# Patient Record
Sex: MALE | Race: WHITE | NOT HISPANIC OR LATINO | Employment: UNEMPLOYED | URBAN - NONMETROPOLITAN AREA
[De-identification: names, ages, dates, MRNs, and addresses within clinical notes are randomized per-mention and may not be internally consistent; named-entity substitution may affect disease eponyms.]

---

## 2021-04-19 ENCOUNTER — APPOINTMENT (EMERGENCY)
Dept: RADIOLOGY | Facility: HOSPITAL | Age: 56
End: 2021-04-19
Payer: COMMERCIAL

## 2021-04-19 ENCOUNTER — HOSPITAL ENCOUNTER (EMERGENCY)
Facility: HOSPITAL | Age: 56
Discharge: HOME | End: 2021-04-19
Attending: EMERGENCY MEDICINE | Admitting: EMERGENCY MEDICINE
Payer: COMMERCIAL

## 2021-04-19 VITALS
BODY MASS INDEX: 30.33 KG/M2 | WEIGHT: 188.71 LBS | SYSTOLIC BLOOD PRESSURE: 154 MMHG | DIASTOLIC BLOOD PRESSURE: 73 MMHG | HEART RATE: 71 BPM | TEMPERATURE: 98.6 F | OXYGEN SATURATION: 98 % | RESPIRATION RATE: 17 BRPM | HEIGHT: 66 IN

## 2021-04-19 DIAGNOSIS — G62.9 NEUROPATHY: Primary | ICD-10-CM

## 2021-04-19 PROCEDURE — 96372 THER/PROPH/DIAG INJ SC/IM: CPT

## 2021-04-19 PROCEDURE — 99284 EMERGENCY DEPT VISIT MOD MDM: CPT | Performed by: EMERGENCY MEDICINE

## 2021-04-19 PROCEDURE — 99283 EMERGENCY DEPT VISIT LOW MDM: CPT

## 2021-04-19 PROCEDURE — 73630 X-RAY EXAM OF FOOT: CPT

## 2021-04-19 RX ORDER — KETOROLAC TROMETHAMINE 30 MG/ML
15 INJECTION, SOLUTION INTRAMUSCULAR; INTRAVENOUS ONCE
Status: COMPLETED | OUTPATIENT
Start: 2021-04-19 | End: 2021-04-19

## 2021-04-19 RX ORDER — BUSPIRONE HYDROCHLORIDE 10 MG/1
10 TABLET ORAL AS NEEDED
COMMUNITY

## 2021-04-19 RX ORDER — ACETAMINOPHEN 325 MG/1
325 TABLET ORAL EVERY 6 HOURS PRN
COMMUNITY

## 2021-04-19 RX ORDER — GABAPENTIN 600 MG/1
1200 TABLET ORAL 2 TIMES DAILY
COMMUNITY

## 2021-04-19 RX ORDER — CYCLOBENZAPRINE HCL 10 MG
10 TABLET ORAL 3 TIMES DAILY PRN
COMMUNITY

## 2021-04-19 RX ORDER — ATORVASTATIN CALCIUM 40 MG/1
40 TABLET, FILM COATED ORAL DAILY
COMMUNITY

## 2021-04-19 RX ORDER — CLOPIDOGREL BISULFATE 75 MG/1
75 TABLET ORAL DAILY
COMMUNITY

## 2021-04-19 RX ORDER — ASPIRIN 81 MG/1
81 TABLET ORAL DAILY
COMMUNITY

## 2021-04-19 RX ORDER — PANTOPRAZOLE SODIUM 40 MG/1
40 TABLET, DELAYED RELEASE ORAL DAILY
COMMUNITY

## 2021-04-19 RX ORDER — KETOROLAC TROMETHAMINE 10 MG/1
10 TABLET, FILM COATED ORAL EVERY 6 HOURS PRN
Qty: 30 TABLET | Refills: 0 | Status: SHIPPED | OUTPATIENT
Start: 2021-04-19

## 2021-04-19 RX ADMIN — KETOROLAC TROMETHAMINE 15 MG: 30 INJECTION, SOLUTION INTRAMUSCULAR at 08:49

## 2021-04-19 NOTE — ED PROVIDER NOTES
History  Chief Complaint   Patient presents with    Foot Pain     pt c/o increased pain in righ foot stating foot curled and unsure if stubbed foot  hx drop foot  pt taking current medication regimen and tylenol in addition with some relief  41-year-old male with a history of right lower extremity neuropathy secondary to previous need for fasciotomy  Patient reports he has chronic footdrop in this extremity  Started with increasing amount of pain over the last 24 to 48 hours  Is not controlled by his normal pain medications of gabapentin  Paid is increased with ambulation  Cannot recall any specific injury  Reports that it is a shooting pain radiating up from his lateral malleolus to about the area of the mid calf  Denies any fevers or chills  No nausea vomiting  No chest pain or shortness of breath  History provided by:  Patient and spouse  Leg Pain  Location:  Ankle and leg  Leg location:  R leg  Ankle location:  R ankle  Pain details:     Quality:  Aching and burning    Radiates to:  R leg    Severity:  Severe    Onset quality:  Gradual    Timing:  Constant    Progression:  Unchanged  Chronicity:  Recurrent  Worsened by: Activity  Associated symptoms: no back pain, no decreased ROM, no fatigue, no fever, no itching, no muscle weakness, no neck pain, no numbness, no stiffness, no swelling and no tingling        Prior to Admission Medications   Prescriptions Last Dose Informant Patient Reported?  Taking?   acetaminophen (TYLENOL) 325 mg tablet  Self Yes Yes   Sig: Take 325 mg by mouth every 6 (six) hours as needed for mild pain   aspirin (ECOTRIN LOW STRENGTH) 81 mg EC tablet  Self Yes Yes   Sig: Take 81 mg by mouth daily   atorvastatin (LIPITOR) 40 mg tablet  Self Yes Yes   Sig: Take 40 mg by mouth daily   busPIRone (BUSPAR) 10 mg tablet  Self Yes Yes   Sig: Take 10 mg by mouth as needed   clopidogrel (PLAVIX) 75 mg tablet  Self Yes Yes   Sig: Take 75 mg by mouth daily   cyclobenzaprine (FLEXERIL) 10 mg tablet  Self Yes Yes   Sig: Take 10 mg by mouth 3 (three) times a day as needed for muscle spasms   gabapentin (NEURONTIN) 600 MG tablet  Self Yes Yes   Sig: Take 1,200 mg by mouth 2 (two) times a day   metoprolol tartrate (LOPRESSOR) 25 mg tablet  Self Yes Yes   Sig: Take 25 mg by mouth every 12 (twelve) hours   pantoprazole (PROTONIX) 40 mg tablet  Self Yes Yes   Sig: Take 40 mg by mouth daily      Facility-Administered Medications: None       Past Medical History:   Diagnosis Date    Coronary artery disease     Foot drop, right foot        Past Surgical History:   Procedure Laterality Date    CORONARY ANGIOPLASTY WITH STENT PLACEMENT  2009    ILIAC ARTERY STENT Bilateral 01/24/2017    NECK SURGERY      screws and plates    TONSILLECTOMY         History reviewed  No pertinent family history  I have reviewed and agree with the history as documented  E-Cigarette/Vaping    E-Cigarette Use Never User      E-Cigarette/Vaping Substances     Social History     Tobacco Use    Smoking status: Current Every Day Smoker     Packs/day: 1 00     Types: Cigarettes    Smokeless tobacco: Never Used   Substance Use Topics    Alcohol use: Not Currently    Drug use: Yes     Types: Marijuana       Review of Systems   Constitutional: Negative  Negative for fatigue and fever  HENT: Negative  Respiratory: Negative  Cardiovascular: Negative  Gastrointestinal: Negative  Musculoskeletal: Positive for arthralgias and myalgias  Negative for back pain, neck pain and stiffness  Skin: Negative for itching  All other systems reviewed and are negative  Physical Exam  Physical Exam  Vitals signs and nursing note reviewed  Constitutional:       General: He is in acute distress  Appearance: Normal appearance  He is normal weight  He is not ill-appearing or toxic-appearing  HENT:      Head: Normocephalic and atraumatic        Right Ear: External ear normal       Left Ear: External ear normal       Nose: Nose normal       Mouth/Throat:      Pharynx: Oropharynx is clear  Eyes:      General:         Right eye: No discharge  Left eye: No discharge  Conjunctiva/sclera: Conjunctivae normal    Pulmonary:      Effort: Pulmonary effort is normal    Abdominal:      General: Abdomen is flat  Musculoskeletal:         General: Tenderness present  No swelling or signs of injury  Right ankle: No tenderness  Right lower leg: He exhibits no tenderness  Skin:     General: Skin is warm  Capillary Refill: Capillary refill takes less than 2 seconds  Coloration: Skin is not jaundiced or pale  Findings: No bruising, erythema or lesion  Neurological:      General: No focal deficit present  Mental Status: He is alert  Psychiatric:         Mood and Affect: Mood normal          Thought Content: Thought content normal          Judgment: Judgment normal          Vital Signs  ED Triage Vitals [04/19/21 0821]   Temperature Pulse Respirations Blood Pressure SpO2   98 6 °F (37 °C) 73 17 (!) 192/85 99 %      Temp Source Heart Rate Source Patient Position - Orthostatic VS BP Location FiO2 (%)   Temporal Monitor Lying Right arm --      Pain Score       Worst Possible Pain           Vitals:    04/19/21 0821 04/19/21 0845   BP: (!) 192/85 154/73   Pulse: 73 71   Patient Position - Orthostatic VS: Lying          Visual Acuity      ED Medications  Medications   ketorolac (TORADOL) injection 15 mg (15 mg Intramuscular Given 4/19/21 0849)       Diagnostic Studies  Results Reviewed     None                 XR foot 3+ views RIGHT   Final Result by Manasa Graham MD (04/19 3202)      No acute osseous abnormality  Workstation performed: [id number]                    Procedures  Procedures         ED Course  ED Course as of Apr 19 1022   Mon Apr 19, 2021   6477 X-ray negative  Patient reports feeling better    Patient will follow-up with his routine follow-up with Neurology and Pain   Patient stable for discharge  SBIRT 20yo+      Most Recent Value   SBIRT (22 yo +)   In order to provide better care to our patients, we are screening all of our patients for alcohol and drug use  Would it be okay to ask you these screening questions? Yes Filed at: 04/19/2021 0851   Initial Alcohol Screen: US AUDIT-C    1  How often do you have a drink containing alcohol? 1 Filed at: 04/19/2021 0851   2  How many drinks containing alcohol do you have on a typical day you are drinking? 0 Filed at: 04/19/2021 0851   3a  Male UNDER 65: How often do you have five or more drinks on one occasion? 0 Filed at: 04/19/2021 0851   3b  FEMALE Any Age, or MALE 65+: How often do you have 4 or more drinks on one occassion? 0 Filed at: 04/19/2021 0851   Audit-C Score  1 Filed at: 04/19/2021 3536   STEWART: How many times in the past year have you    Used an illegal drug or used a prescription medication for non-medical reasons? Never Filed at: 04/19/2021 0851                    MDM  Number of Diagnoses or Management Options  Neuropathy: established and worsening  Diagnosis management comments: There is no actual area tenderness  Patient describes it more as a neuropathic type pain which he has had before but for some reason it is worse today  Denies any specific injury but reports that because of his footdrop and the lack of sensation he may be unaware of a injury  X-ray was negative  Patient was improved with Toradol  There was no clinical evidence of the patient having a DVT or evidence significant skin compromise that would be concern for infection  There was no outward evidence of fracture  There were no other symptomatology that was concerning for a central issue such as stroke in this patient  As stated, patient received moderate amount of relief with the use of Toradol injection and he was subsequently discharged after negative x-rays      Risk of Complications, Morbidity, and/or Mortality  Presenting problems: moderate  Diagnostic procedures: moderate  Management options: moderate    Patient Progress  Patient progress: improved      Disposition  Final diagnoses:   Neuropathy     Time reflects when diagnosis was documented in both MDM as applicable and the Disposition within this note     Time User Action Codes Description Comment    4/19/2021  9:57 AM Rima Montoya Kd [G62 9] Neuropathy       ED Disposition     ED Disposition Condition Date/Time Comment    Discharge Stable Mon Apr 19, 2021  9:56 AM Miguelina Camarena discharge to home/self care              Follow-up Information     Follow up With Specialties Details Why Contact Info    Michelle Recinos MD Internal Medicine  As scheduled [address]  [address]  [phone]            Discharge Medication List as of 4/19/2021  9:59 AM      START taking these medications    Details   ketorolac (TORADOL) 10 mg tablet Take 1 tablet (10 mg total) by mouth every 6 (six) hours as needed for mild pain or moderate pain, Starting Mon 4/19/2021, Normal         CONTINUE these medications which have NOT CHANGED    Details   acetaminophen (TYLENOL) 325 mg tablet Take 325 mg by mouth every 6 (six) hours as needed for mild pain, Historical Med      aspirin (ECOTRIN LOW STRENGTH) 81 mg EC tablet Take 81 mg by mouth daily, Historical Med      atorvastatin (LIPITOR) 40 mg tablet Take 40 mg by mouth daily, Historical Med      busPIRone (BUSPAR) 10 mg tablet Take 10 mg by mouth as needed, Historical Med      clopidogrel (PLAVIX) 75 mg tablet Take 75 mg by mouth daily, Historical Med      cyclobenzaprine (FLEXERIL) 10 mg tablet Take 10 mg by mouth 3 (three) times a day as needed for muscle spasms, Historical Med      gabapentin (NEURONTIN) 600 MG tablet Take 1,200 mg by mouth 2 (two) times a day, Historical Med      metoprolol tartrate (LOPRESSOR) 25 mg tablet Take 25 mg by mouth every 12 (twelve) hours, Historical Med pantoprazole (PROTONIX) 40 mg tablet Take 40 mg by mouth daily, Historical Med           No discharge procedures on file      PDMP Review     None          ED Provider  Electronically Signed by           Carrie Whitman DO  04/19/21 1281

## 2021-04-19 NOTE — DISCHARGE INSTRUCTIONS
Your pain appears to be related to an exacerbation of your neuropathy  Please follow-up with your neurologist and your pain management specialist   We have also sent a prescription to your pharmacy for a nonnarcotic pain medication  Thank you for choosing the emergency department at [facility]  We appreciated the opportunity and privilege to address your healthcare needs  We remain available to you should you require additional evaluation or assistance  We value your feedback and would appreciate the opportunity to address anything you identified as an opportunity to improve or where we excelled  If there are colleagues who deserve special recognition, please let us know! We hope you are feeling better soon! Vaccination is part of comprehensive strategy to prevent the spread of COVID-19  Your doctor today strongly recommends that if the vaccine becomes available to you and you have no contraindications, receiving it would be beneficial to you, your family and the community  Even with the vaccination, it is still important to wear a mask, physically distance and have good hand hygiene  Thank you for helping to prevent the spread!

## 2023-11-13 ENCOUNTER — APPOINTMENT (EMERGENCY)
Dept: CT IMAGING | Facility: HOSPITAL | Age: 58
End: 2023-11-13
Payer: COMMERCIAL

## 2023-11-13 ENCOUNTER — HOSPITAL ENCOUNTER (EMERGENCY)
Facility: HOSPITAL | Age: 58
Discharge: ANOTHER HOSPITAL | End: 2023-11-13
Attending: EMERGENCY MEDICINE
Payer: COMMERCIAL

## 2023-11-13 VITALS
WEIGHT: 175 LBS | SYSTOLIC BLOOD PRESSURE: 146 MMHG | RESPIRATION RATE: 23 BRPM | DIASTOLIC BLOOD PRESSURE: 70 MMHG | HEART RATE: 71 BPM | HEIGHT: 66 IN | OXYGEN SATURATION: 94 % | TEMPERATURE: 97.7 F | BODY MASS INDEX: 28.12 KG/M2

## 2023-11-13 DIAGNOSIS — I21.4 NSTEMI (NON-ST ELEVATED MYOCARDIAL INFARCTION) (HCC): Primary | ICD-10-CM

## 2023-11-13 LAB
2HR DELTA HS TROPONIN: 211 NG/L
2HR DELTA HS TROPONIN: 233 NG/L
4HR DELTA HS TROPONIN: 239 NG/L
4HR DELTA HS TROPONIN: 271 NG/L
ALBUMIN SERPL BCP-MCNC: 3.9 G/DL (ref 3.5–5)
ALP SERPL-CCNC: 51 U/L (ref 34–104)
ALT SERPL W P-5'-P-CCNC: 8 U/L (ref 7–52)
ANION GAP SERPL CALCULATED.3IONS-SCNC: 7 MMOL/L
APTT PPP: 30 SECONDS (ref 23–37)
APTT PPP: 46 SECONDS (ref 23–37)
AST SERPL W P-5'-P-CCNC: 15 U/L (ref 13–39)
ATRIAL RATE: 57 BPM
ATRIAL RATE: 59 BPM
BASOPHILS # BLD AUTO: 0.09 THOUSANDS/ÂΜL (ref 0–0.1)
BASOPHILS NFR BLD AUTO: 1 % (ref 0–1)
BILIRUB SERPL-MCNC: 0.42 MG/DL (ref 0.2–1)
BNP SERPL-MCNC: 364 PG/ML (ref 0–100)
BUN SERPL-MCNC: 6 MG/DL (ref 5–25)
CALCIUM SERPL-MCNC: 8.7 MG/DL (ref 8.4–10.2)
CARDIAC TROPONIN I PNL SERPL HS: 1169 NG/L
CARDIAC TROPONIN I PNL SERPL HS: 1175 NG/L
CARDIAC TROPONIN I PNL SERPL HS: 1562 NG/L (ref 8–18)
CARDIAC TROPONIN I PNL SERPL HS: 1675 NG/L
CARDIAC TROPONIN I PNL SERPL HS: 1882 NG/L (ref 8–18)
CARDIAC TROPONIN I PNL SERPL HS: 1886 NG/L
CARDIAC TROPONIN I PNL SERPL HS: 1946 NG/L
CARDIAC TROPONIN I PNL SERPL HS: 936 NG/L
CHLORIDE SERPL-SCNC: 106 MMOL/L (ref 96–108)
CO2 SERPL-SCNC: 26 MMOL/L (ref 21–32)
CREAT SERPL-MCNC: 0.74 MG/DL (ref 0.6–1.3)
D DIMER PPP FEU-MCNC: 0.44 UG/ML FEU
EOSINOPHIL # BLD AUTO: 0.53 THOUSAND/ÂΜL (ref 0–0.61)
EOSINOPHIL NFR BLD AUTO: 5 % (ref 0–6)
ERYTHROCYTE [DISTWIDTH] IN BLOOD BY AUTOMATED COUNT: 13.5 % (ref 11.6–15.1)
GFR SERPL CREATININE-BSD FRML MDRD: 101 ML/MIN/1.73SQ M
GLUCOSE SERPL-MCNC: 156 MG/DL (ref 65–140)
HCT VFR BLD AUTO: 50.7 % (ref 36.5–49.3)
HGB BLD-MCNC: 16.7 G/DL (ref 12–17)
IMM GRANULOCYTES # BLD AUTO: 0.05 THOUSAND/UL (ref 0–0.2)
IMM GRANULOCYTES NFR BLD AUTO: 1 % (ref 0–2)
INR PPP: 0.86 (ref 0.84–1.19)
LYMPHOCYTES # BLD AUTO: 2.52 THOUSANDS/ÂΜL (ref 0.6–4.47)
LYMPHOCYTES NFR BLD AUTO: 23 % (ref 14–44)
MCH RBC QN AUTO: 30.9 PG (ref 26.8–34.3)
MCHC RBC AUTO-ENTMCNC: 32.9 G/DL (ref 31.4–37.4)
MCV RBC AUTO: 94 FL (ref 82–98)
MONOCYTES # BLD AUTO: 1.18 THOUSAND/ÂΜL (ref 0.17–1.22)
MONOCYTES NFR BLD AUTO: 11 % (ref 4–12)
NEUTROPHILS # BLD AUTO: 6.71 THOUSANDS/ÂΜL (ref 1.85–7.62)
NEUTS SEG NFR BLD AUTO: 59 % (ref 43–75)
NRBC BLD AUTO-RTO: 0 /100 WBCS
P AXIS: 79 DEGREES
P AXIS: 81 DEGREES
PLATELET # BLD AUTO: 296 THOUSANDS/UL (ref 149–390)
PMV BLD AUTO: 9.6 FL (ref 8.9–12.7)
POTASSIUM SERPL-SCNC: 3.7 MMOL/L (ref 3.5–5.3)
PR INTERVAL: 128 MS
PR INTERVAL: 128 MS
PROT SERPL-MCNC: 6.3 G/DL (ref 6.4–8.4)
PROTHROMBIN TIME: 12 SECONDS (ref 11.6–14.5)
QRS AXIS: -71 DEGREES
QRS AXIS: -88 DEGREES
QRSD INTERVAL: 88 MS
QRSD INTERVAL: 90 MS
QT INTERVAL: 440 MS
QT INTERVAL: 446 MS
QTC INTERVAL: 428 MS
QTC INTERVAL: 441 MS
RBC # BLD AUTO: 5.41 MILLION/UL (ref 3.88–5.62)
SODIUM SERPL-SCNC: 139 MMOL/L (ref 135–147)
T WAVE AXIS: 248 DEGREES
T WAVE AXIS: 255 DEGREES
VENTRICULAR RATE: 57 BPM
VENTRICULAR RATE: 59 BPM
WBC # BLD AUTO: 11.08 THOUSAND/UL (ref 4.31–10.16)

## 2023-11-13 PROCEDURE — 99284 EMERGENCY DEPT VISIT MOD MDM: CPT

## 2023-11-13 PROCEDURE — 96361 HYDRATE IV INFUSION ADD-ON: CPT

## 2023-11-13 PROCEDURE — 85610 PROTHROMBIN TIME: CPT | Performed by: EMERGENCY MEDICINE

## 2023-11-13 PROCEDURE — 83880 ASSAY OF NATRIURETIC PEPTIDE: CPT | Performed by: EMERGENCY MEDICINE

## 2023-11-13 PROCEDURE — 85730 THROMBOPLASTIN TIME PARTIAL: CPT

## 2023-11-13 PROCEDURE — 96376 TX/PRO/DX INJ SAME DRUG ADON: CPT

## 2023-11-13 PROCEDURE — 96365 THER/PROPH/DIAG IV INF INIT: CPT

## 2023-11-13 PROCEDURE — 96366 THER/PROPH/DIAG IV INF ADDON: CPT

## 2023-11-13 PROCEDURE — 93005 ELECTROCARDIOGRAM TRACING: CPT

## 2023-11-13 PROCEDURE — 85730 THROMBOPLASTIN TIME PARTIAL: CPT | Performed by: EMERGENCY MEDICINE

## 2023-11-13 PROCEDURE — 36415 COLL VENOUS BLD VENIPUNCTURE: CPT | Performed by: EMERGENCY MEDICINE

## 2023-11-13 PROCEDURE — 85025 COMPLETE CBC W/AUTO DIFF WBC: CPT | Performed by: EMERGENCY MEDICINE

## 2023-11-13 PROCEDURE — G1004 CDSM NDSC: HCPCS

## 2023-11-13 PROCEDURE — 99285 EMERGENCY DEPT VISIT HI MDM: CPT | Performed by: EMERGENCY MEDICINE

## 2023-11-13 PROCEDURE — 85379 FIBRIN DEGRADATION QUANT: CPT | Performed by: EMERGENCY MEDICINE

## 2023-11-13 PROCEDURE — 71275 CT ANGIOGRAPHY CHEST: CPT

## 2023-11-13 PROCEDURE — 96375 TX/PRO/DX INJ NEW DRUG ADDON: CPT

## 2023-11-13 PROCEDURE — 84484 ASSAY OF TROPONIN QUANT: CPT | Performed by: EMERGENCY MEDICINE

## 2023-11-13 PROCEDURE — 74174 CTA ABD&PLVS W/CONTRAST: CPT

## 2023-11-13 PROCEDURE — 84484 ASSAY OF TROPONIN QUANT: CPT

## 2023-11-13 PROCEDURE — 80053 COMPREHEN METABOLIC PANEL: CPT | Performed by: EMERGENCY MEDICINE

## 2023-11-13 RX ORDER — NITROGLYCERIN 0.4 MG/1
0.4 TABLET SUBLINGUAL ONCE
Status: COMPLETED | OUTPATIENT
Start: 2023-11-13 | End: 2023-11-13

## 2023-11-13 RX ORDER — HEPARIN SODIUM 1000 [USP'U]/ML
4000 INJECTION, SOLUTION INTRAVENOUS; SUBCUTANEOUS EVERY 6 HOURS PRN
Status: DISCONTINUED | OUTPATIENT
Start: 2023-11-13 | End: 2023-11-13 | Stop reason: HOSPADM

## 2023-11-13 RX ORDER — MORPHINE SULFATE 4 MG/ML
4 INJECTION, SOLUTION INTRAMUSCULAR; INTRAVENOUS ONCE
Status: COMPLETED | OUTPATIENT
Start: 2023-11-13 | End: 2023-11-13

## 2023-11-13 RX ORDER — HEPARIN SODIUM 1000 [USP'U]/ML
2000 INJECTION, SOLUTION INTRAVENOUS; SUBCUTANEOUS EVERY 6 HOURS PRN
Status: DISCONTINUED | OUTPATIENT
Start: 2023-11-13 | End: 2023-11-13 | Stop reason: HOSPADM

## 2023-11-13 RX ORDER — FENTANYL CITRATE 50 UG/ML
50 INJECTION, SOLUTION INTRAMUSCULAR; INTRAVENOUS ONCE
Status: COMPLETED | OUTPATIENT
Start: 2023-11-13 | End: 2023-11-13

## 2023-11-13 RX ORDER — ASPIRIN 81 MG/1
81 TABLET, CHEWABLE ORAL DAILY
Status: DISCONTINUED | OUTPATIENT
Start: 2023-11-14 | End: 2023-11-13 | Stop reason: HOSPADM

## 2023-11-13 RX ORDER — GABAPENTIN 300 MG/1
600 CAPSULE ORAL 2 TIMES DAILY
Status: DISCONTINUED | OUTPATIENT
Start: 2023-11-13 | End: 2023-11-13 | Stop reason: HOSPADM

## 2023-11-13 RX ORDER — BUSPIRONE HYDROCHLORIDE 5 MG/1
10 TABLET ORAL 2 TIMES DAILY
Status: DISCONTINUED | OUTPATIENT
Start: 2023-11-13 | End: 2023-11-13 | Stop reason: HOSPADM

## 2023-11-13 RX ORDER — CLOPIDOGREL BISULFATE 75 MG/1
75 TABLET ORAL DAILY
Status: DISCONTINUED | OUTPATIENT
Start: 2023-11-14 | End: 2023-11-13 | Stop reason: HOSPADM

## 2023-11-13 RX ORDER — PANTOPRAZOLE SODIUM 40 MG/1
40 TABLET, DELAYED RELEASE ORAL DAILY
Status: DISCONTINUED | OUTPATIENT
Start: 2023-11-13 | End: 2023-11-13 | Stop reason: HOSPADM

## 2023-11-13 RX ORDER — HEPARIN SODIUM 10000 [USP'U]/100ML
3-20 INJECTION, SOLUTION INTRAVENOUS
Status: DISCONTINUED | OUTPATIENT
Start: 2023-11-13 | End: 2023-11-13 | Stop reason: HOSPADM

## 2023-11-13 RX ORDER — ACETAMINOPHEN 325 MG/1
650 TABLET ORAL EVERY 6 HOURS PRN
Status: DISCONTINUED | OUTPATIENT
Start: 2023-11-13 | End: 2023-11-13 | Stop reason: HOSPADM

## 2023-11-13 RX ORDER — ATORVASTATIN CALCIUM 10 MG/1
10 TABLET, FILM COATED ORAL
Status: DISCONTINUED | OUTPATIENT
Start: 2023-11-13 | End: 2023-11-13 | Stop reason: HOSPADM

## 2023-11-13 RX ORDER — CYCLOBENZAPRINE HCL 10 MG
10 TABLET ORAL 3 TIMES DAILY PRN
Status: DISCONTINUED | OUTPATIENT
Start: 2023-11-13 | End: 2023-11-13 | Stop reason: HOSPADM

## 2023-11-13 RX ORDER — HEPARIN SODIUM 1000 [USP'U]/ML
4000 INJECTION, SOLUTION INTRAVENOUS; SUBCUTANEOUS ONCE
Status: COMPLETED | OUTPATIENT
Start: 2023-11-13 | End: 2023-11-13

## 2023-11-13 RX ADMIN — MORPHINE SULFATE 4 MG: 4 INJECTION INTRAVENOUS at 05:07

## 2023-11-13 RX ADMIN — HEPARIN SODIUM 2000 UNITS: 1000 INJECTION INTRAVENOUS; SUBCUTANEOUS at 13:48

## 2023-11-13 RX ADMIN — NITROGLYCERIN 0.4 MG: 0.4 TABLET SUBLINGUAL at 05:07

## 2023-11-13 RX ADMIN — HEPARIN SODIUM 12 UNITS/KG/HR: 10000 INJECTION, SOLUTION INTRAVENOUS at 07:23

## 2023-11-13 RX ADMIN — BUSPIRONE HYDROCHLORIDE 10 MG: 5 TABLET ORAL at 17:36

## 2023-11-13 RX ADMIN — SODIUM CHLORIDE 1000 ML: 0.9 INJECTION, SOLUTION INTRAVENOUS at 05:07

## 2023-11-13 RX ADMIN — PANTOPRAZOLE SODIUM 40 MG: 40 TABLET, DELAYED RELEASE ORAL at 17:36

## 2023-11-13 RX ADMIN — NITROGLYCERIN 1 INCH: 20 OINTMENT TOPICAL at 06:25

## 2023-11-13 RX ADMIN — GABAPENTIN 600 MG: 300 CAPSULE ORAL at 17:36

## 2023-11-13 RX ADMIN — MORPHINE SULFATE 4 MG: 4 INJECTION INTRAVENOUS at 06:24

## 2023-11-13 RX ADMIN — FENTANYL CITRATE 50 MCG: 50 INJECTION INTRAMUSCULAR; INTRAVENOUS at 07:45

## 2023-11-13 RX ADMIN — NITROGLYCERIN 0.4 MG: 0.4 TABLET SUBLINGUAL at 06:24

## 2023-11-13 RX ADMIN — HEPARIN SODIUM 4000 UNITS: 1000 INJECTION INTRAVENOUS; SUBCUTANEOUS at 07:22

## 2023-11-13 RX ADMIN — IOHEXOL 100 ML: 350 INJECTION, SOLUTION INTRAVENOUS at 06:19

## 2023-11-13 NOTE — ED CARE HANDOFF
Emergency Department Sign Out Note        Sign out and transfer of care from Dr. Lashae Roblero. See Separate Emergency Department note. The patient, Tati Perdomo, was evaluated by the previous provider for chest and neck pain. Workup Completed:  NSTEMI    ED Course / Workup Pending (followup):  CTA unremarkable. We will start heparin drip. Accepted at [facility] bed placement. 8554: Patient now requesting go to [facility]. We will contact PAC.    0910: Patient currently pain-free. ST abnormalities noted in the inferior lateral aspects. The T waves now appear inverted in 3 and aVF. Again, patient pain-free. Accepted at [address] [facility] by Dr. Dwayne Ho    1300:  Patient requesting to leave. Tired of waiting. Patient is awake and alert and able to make decisions. Made aware that signing out before further evaluation is not prudent as hid enzymes are positive c/w NSTEMI. Risks of leaving include further damage to your heart and even death. Patient provided with AMA papers. Patient now decided that he will stay. HEART Risk Score      Flowsheet Row Most Recent Value   Heart Score Risk Calculator    History 0 Filed at: 11/13/2023 0513   ECG 1 Filed at: 11/13/2023 0513   Age 1 Filed at: 11/13/2023 0513   Risk Factors 2 Filed at: 11/13/2023 9242   Troponin --   HEART Score --                                       Procedures  Medical Decision Making  Amount and/or Complexity of Data Reviewed  Labs: ordered. Radiology: ordered. Risk  Prescription drug management.             Disposition  Final diagnoses:   NSTEMI (non-ST elevated myocardial infarction) [facility])     Time reflects when diagnosis was documented in both MDM as applicable and the Disposition within this note       Time User Action Codes Description Comment    11/13/2023  6:05 AM Mariusz Sinha Add [I21.4] NSTEMI (non-ST elevated myocardial infarction) [facility])           ED Disposition       ED Disposition   Transfer to Another [address]    Condition   --    Date/Time   Mon Nov 13, 2023 3898    Comment   Isidro Agarwal should be transferred out to [place] [facility] MD Documentation      Jg Tuttle Most Recent Value   Patient Condition The patient has been stabilized such that within reasonable medical probability, no material deterioration of the patient condition or the condition of the unborn child(juan manuel) is likely to result from the transfer   Benefits of Transfer Specialized equipment and/or services available at the receiving facility (Include comment)________________________  [cath]   Risks of Transfer Possible worsening of condition or death during transfer, Potential deterioration of medical condition, Loss of IV, Increased discomfort during transfer, Potential for delay in receiving treatment   Accepting Physician Uri Dean by (Company and Unit #) ALS   Sending MD [facility]   Provider Certification General risk, such as traffic hazards, adverse weather conditions, rough terrain or turbulence, possible failure of equipment (including vehicle or aircraft), or consequences of actions of persons outside the control of the transport personnel, Unanticipated needs of medical equipment and personnel during transport, Risk of worsening condition          RN Documentation      [address] Name, [facility]  [facility]   Transported by [employer] and Unit #) ALS          Follow-up Information    None       Patient's Medications   Discharge Prescriptions    No medications on file     No discharge procedures on file.        ED Provider  Electronically Signed by     Prudence Bustamante MD  11/13/23 [facility] Efren Cruz MD  11/13/23 [address] [facility] Efren Cruz MD  11/13/23 [facility] Efren Cruz MD  11/13/23 [facility] Efren Cruz MD  11/13/23 2030 [facility] Efren Cruz MD  11/13/23 7474

## 2023-11-13 NOTE — EMTALA/ACUTE CARE TRANSFER
[address] [address]  Dept: [phone]      EMTALA TRANSFER CONSENT    NAME Regina Contreras DOB 1965                              MRN [id number]    I have been informed of my rights regarding examination, treatment, and transfer   by Dr. Naveen Bernstein DO    Benefits: Specialized equipment and/or services available at the receiving facility (Include comment)________________________ (cath)    Risks: Possible worsening of condition or death during transfer, Potential deterioration of medical condition, Loss of IV, Increased discomfort during transfer, Potential for delay in receiving treatment      Consent for Transfer:  I acknowledge that my medical condition has been evaluated and explained to me by the emergency department physician or other qualified medical person and/or my attending physician, who has recommended that I be transferred to the service of  Accepting Physician: Dr. Kaushal Fraga at [address] [place] [address] [address] Name, [address] [facility] [address] : [facility]. The above potential benefits of such transfer, the potential risks associated with such transfer, and the probable risks of not being transferred have been explained to me, and I fully understand them. The doctor has explained that, in my case, the benefits of transfer outweigh the risks. I agree to be transferred. I authorize the performance of emergency medical procedures and treatments upon me in both transit and upon arrival at the receiving facility. Additionally, I authorize the release of any and all medical records to the receiving facility and request they be transported with me, if possible. I understand that the safest mode of transportation during a medical emergency is an ambulance and that the Hospital advocates the use of this mode of transport.  Risks of traveling to the receiving facility by car, including absence of medical control, life sustaining equipment, such as oxygen, and medical personnel has been explained to me and I fully understand them. (MELISA CORRECT BOX BELOW)  [  ]  I consent to the stated transfer and to be transported by ambulance/helicopter. [  ]  I consent to the stated transfer, but refuse transportation by ambulance and accept full responsibility for my transportation by car. I understand the risks of non-ambulance transfers and I exonerate the Hospital and its staff from any deterioration in my condition that results from this refusal.    X___________________________________________    DATE  23  TIME________  Signature of patient or legally responsible individual signing on patient behalf           RELATIONSHIP TO PATIENT_________________________          Provider Certification    NAME Sanchez Mejia                                         1965                              MRN [id number]    A medical screening exam was performed on the above named patient. Based on the examination:    Condition Necessitating Transfer The encounter diagnosis was NSTEMI (non-ST elevated myocardial infarction) ([address]).     Patient Condition: The patient has been stabilized such that within reasonable medical probability, no material deterioration of the patient condition or the condition of the unborn child(juan manuel) is likely to result from the transfer    Reason for Transfer:      Transfer Requirements: [address] available and qualified personnel available for treatment as acknowledged by    Agreed to accept transfer and to provide appropriate medical treatment as acknowledged by       Dr. Mabeline Goodpasture  Appropriate medical records of the examination and treatment of the patient are provided at the time of transfer   [address] _______  Transfer will be performed by qualified personnel from [address] [place] [address]  and appropriate transfer equipment as required, including the use of necessary and appropriate life support measures. Provider Certification: I have examined the patient and explained the following risks and benefits of being transferred/refusing transfer to the patient/family:  General risk, such as traffic hazards, adverse weather conditions, rough terrain or turbulence, possible failure of equipment (including vehicle or aircraft), or consequences of actions of persons outside the control of the transport personnel, Unanticipated needs of medical equipment and personnel during transport, Risk of worsening condition      Based on these reasonable risks and benefits to the patient and/or the unborn child(juan manuel), and based upon the information available at the time of the patient’s examination, I certify that the medical benefits reasonably to be expected from the provision of appropriate medical treatments at another medical facility outweigh the increasing risks, if any, to the individual’s medical condition, and in the case of labor to the unborn child, from effecting the transfer.     X____________________________________________ DATE 11/13/23        TIME_______      ORIGINAL - SEND TO MEDICAL RECORDS   COPY - SEND WITH PATIENT DURING TRANSFER

## 2023-11-13 NOTE — EMTALA/ACUTE CARE TRANSFER
[address] [address]  Dept: [phone]      EMTALA TRANSFER CONSENT    NAME Baldomero Cherry                                         1965                              MRN [id number]    I have been informed of my rights regarding examination, treatment, and transfer   by Dr. Yelena Orellana MD    Benefits: Specialized equipment and/or services available at the receiving facility (Include comment)________________________ (cath)    Risks: Possible worsening of condition or death during transfer, Potential deterioration of medical condition, Loss of IV, Increased discomfort during transfer, Potential for delay in receiving treatment      Consent for Transfer:  I acknowledge that my medical condition has been evaluated and explained to me by the emergency department physician or other qualified medical person and/or my attending physician, who has recommended that I be transferred to the service of  Accepting Physician: Zara Sierra at [address] Name, [facility] : [facility]. The above potential benefits of such transfer, the potential risks associated with such transfer, and the probable risks of not being transferred have been explained to me, and I fully understand them. The doctor has explained that, in my case, the benefits of transfer outweigh the risks. I agree to be transferred. I authorize the performance of emergency medical procedures and treatments upon me in both transit and upon arrival at the receiving facility. Additionally, I authorize the release of any and all medical records to the receiving facility and request they be transported with me, if possible. I understand that the safest mode of transportation during a medical emergency is an ambulance and that the Hospital advocates the use of this mode of transport.  Risks of traveling to the receiving facility by car, including absence of medical control, life sustaining equipment, such as oxygen, and medical personnel has been explained to me and I fully understand them. (MELISA CORRECT BOX BELOW)  [  ]  I consent to the stated transfer and to be transported by ambulance/helicopter. [  ]  I consent to the stated transfer, but refuse transportation by ambulance and accept full responsibility for my transportation by car. I understand the risks of non-ambulance transfers and I exonerate the Hospital and its staff from any deterioration in my condition that results from this refusal.    X___________________________________________    DATE  23  TIME________  Signature of patient or legally responsible individual signing on patient behalf           RELATIONSHIP TO PATIENT_________________________          Provider Certification    NAME Shivani Landers                                         1965                              MRN [id number]    A medical screening exam was performed on the above named patient. Based on the examination:    Condition Necessitating Transfer The encounter diagnosis was NSTEMI (non-ST elevated myocardial infarction) ([address]).     Patient Condition: The patient has been stabilized such that within reasonable medical probability, no material deterioration of the patient condition or the condition of the unborn child(juan manuel) is likely to result from the transfer    Reason for Transfer:      Transfer Requirements: [address] available and qualified personnel available for treatment as acknowledged by Deacon Robins at [facility] to accept transfer and to provide appropriate medical treatment as acknowledged by       [facility]  Appropriate medical records of the examination and treatment of the patient are provided at the time of transfer   [address] _______  Transfer will be performed by qualified personnel from [address] [place] [address]  and appropriate transfer equipment as required, including the use of necessary and appropriate life support measures. Provider Certification: I have examined the patient and explained the following risks and benefits of being transferred/refusing transfer to the patient/family:  General risk, such as traffic hazards, adverse weather conditions, rough terrain or turbulence, possible failure of equipment (including vehicle or aircraft), or consequences of actions of persons outside the control of the transport personnel, Unanticipated needs of medical equipment and personnel during transport, Risk of worsening condition      Based on these reasonable risks and benefits to the patient and/or the unborn child(juan manuel), and based upon the information available at the time of the patient’s examination, I certify that the medical benefits reasonably to be expected from the provision of appropriate medical treatments at another medical facility outweigh the increasing risks, if any, to the individual’s medical condition, and in the case of labor to the unborn child, from effecting the transfer.     X____________________________________________ DATE 11/13/23        TIME_______      ORIGINAL - SEND TO MEDICAL RECORDS   COPY - SEND WITH PATIENT DURING TRANSFER

## 2023-11-13 NOTE — ED PROVIDER NOTES
History  Chief Complaint   Patient presents with    Neck Pain     Neck pain x several weeks. Pt states that this was the main sx before he ended up with open heart surgery. Pt had nuc stress test, EKG, CXR, and echo, labs and all came back clear. PMHX: neck fusion. Patient is a 49-year-old male with history of hypertension coronary artery disease prior CABG presents the emergency department complaining of neck pain started about 3 weeks ago still present worsening. Patient reports having similar symptoms of neck pain prior to having his prior cardiac bypass surgery/heart attack. Patient also has a history of cervical fusion surgery. Patient had a nuclear stress test 1 month ago due to reporting similar symptoms to his primary physician. History provided by:  Patient and spouse  Neck Pain  Pain location:  L side  Quality:  Aching, cramping and stiffness  Pain severity:  Moderate  Onset quality:  Gradual  Duration:  3 weeks  Timing:  Constant  Progression:  Worsening  Chronicity:  Chronic  Associated symptoms: no chest pain, no fever, no headaches, no numbness and no weakness        Prior to Admission Medications   Prescriptions Last Dose Informant Patient Reported?  Taking?   acetaminophen (TYLENOL) 325 mg tablet  Self Yes Yes   Sig: Take 325 mg by mouth every 6 (six) hours as needed for mild pain   aspirin (ECOTRIN LOW STRENGTH) 81 mg EC tablet  Self Yes Yes   Sig: Take 81 mg by mouth daily   atorvastatin (LIPITOR) 40 mg tablet Not Taking Self Yes No   Sig: Take 40 mg by mouth daily   Patient not taking: Reported on 11/13/2023   busPIRone (BUSPAR) 10 mg tablet  Self Yes Yes   Sig: Take 10 mg by mouth as needed   clopidogrel (PLAVIX) 75 mg tablet  Self Yes Yes   Sig: Take 75 mg by mouth daily   cyclobenzaprine (FLEXERIL) 10 mg tablet Not Taking Self Yes No   Sig: Take 10 mg by mouth 3 (three) times a day as needed for muscle spasms   Patient not taking: Reported on 11/13/2023   gabapentin (NEURONTIN) 600 MG tablet  Self Yes Yes   Sig: Take 1,200 mg by mouth 2 (two) times a day   ketorolac (TORADOL) 10 mg tablet Not Taking  No No   Sig: Take 1 tablet (10 mg total) by mouth every 6 (six) hours as needed for mild pain or moderate pain   Patient not taking: Reported on 11/13/2023   metoprolol tartrate (LOPRESSOR) 25 mg tablet  Self Yes Yes   Sig: Take 25 mg by mouth every 12 (twelve) hours   pantoprazole (PROTONIX) 40 mg tablet  Self Yes Yes   Sig: Take 40 mg by mouth daily      Facility-Administered Medications: None       Past Medical History:   Diagnosis Date    Coronary artery disease     Foot drop, right foot        Past Surgical History:   Procedure Laterality Date    CORONARY ANGIOPLASTY WITH STENT PLACEMENT  2009    ILIAC ARTERY STENT Bilateral 01/24/2017    NECK SURGERY      screws and plates    TONSILLECTOMY         History reviewed. No pertinent family history. I have reviewed and agree with the history as documented. E-Cigarette/Vaping    E-Cigarette Use Never User      E-Cigarette/Vaping Substances     Social History     Tobacco Use    Smoking status: Every Day     Packs/day: 1.50     Types: Cigarettes    Smokeless tobacco: Never   Vaping Use    Vaping Use: Never used   Substance Use Topics    Alcohol use: Not Currently    Drug use: Yes     Types: Marijuana       Review of Systems   Constitutional:  Negative for activity change, appetite change, chills, fatigue and fever. HENT:  Negative for congestion, ear pain, rhinorrhea and sore throat. Eyes:  Negative for discharge, redness and visual disturbance. Respiratory:  Negative for cough, chest tightness, shortness of breath and wheezing. Cardiovascular:  Negative for chest pain and palpitations. Gastrointestinal:  Negative for abdominal pain, constipation, diarrhea, nausea and vomiting. Endocrine: Negative for polydipsia and polyuria. Genitourinary:  Negative for difficulty urinating, dysuria, frequency, hematuria and urgency. Musculoskeletal:  Positive for neck pain. Negative for arthralgias and myalgias. Skin:  Negative for color change, pallor and rash. Neurological:  Negative for dizziness, weakness, light-headedness, numbness and headaches. Hematological:  Negative for adenopathy. Does not bruise/bleed easily. All other systems reviewed and are negative. Physical Exam  Physical Exam  Vitals and nursing note reviewed. Constitutional:       Appearance: He is well-developed. HENT:      Head: Normocephalic and atraumatic. Right Ear: External ear normal.      Left Ear: External ear normal.      Nose: Nose normal.   Eyes:      Conjunctiva/sclera: Conjunctivae normal.      Pupils: Pupils are equal, round, and reactive to light. Cardiovascular:      Rate and Rhythm: Normal rate and regular rhythm. Heart sounds: Normal heart sounds. Pulmonary:      Effort: Pulmonary effort is normal. No respiratory distress. Breath sounds: Normal breath sounds. No wheezing or rales. Chest:      Chest wall: No tenderness. Abdominal:      General: Bowel sounds are normal. There is no distension. Palpations: Abdomen is soft. Tenderness: There is no abdominal tenderness. There is no guarding. Musculoskeletal:         General: Normal range of motion. Cervical back: Normal range of motion and neck supple. Spasms and tenderness present. Pain with movement and muscular tenderness present. Skin:     General: Skin is warm and dry. Neurological:      Mental Status: He is alert and oriented to person, place, and time. Cranial Nerves: No cranial nerve deficit. Sensory: No sensory deficit.          Vital Signs  ED Triage Vitals [11/13/23 0500]   Temperature Pulse Respirations Blood Pressure SpO2   97.7 °F (36.5 °C) 90 18 (!) 195/98 99 %      Temp Source Heart Rate Source Patient Position - Orthostatic VS BP Location FiO2 (%)   Temporal Monitor Lying Right arm --      Pain Score       9 Vitals:    11/13/23 1615 11/13/23 1730 11/13/23 1745 11/13/23 1800   BP: 141/61 142/76 137/59 146/70   Pulse: 57 62 60 71   Patient Position - Orthostatic VS:             Visual Acuity      ED Medications  Medications   nitroglycerin (NITROSTAT) SL tablet 0.4 mg (0.4 mg Sublingual Given 11/13/23 0507)   morphine injection 4 mg (4 mg Intravenous Given 11/13/23 0507)   sodium chloride 0.9 % bolus 1,000 mL (0 mL Intravenous Stopped 11/13/23 0619)   nitroglycerin (NITROSTAT) SL tablet 0.4 mg (0.4 mg Sublingual Given 11/13/23 0624)   nitroglycerin (NITRO-BID) 2 % TD ointment 1 inch (1 inch Topical Given 11/13/23 0625)   morphine injection 4 mg (4 mg Intravenous Given 11/13/23 0624)   iohexol (OMNIPAQUE) 350 MG/ML injection (SINGLE-DOSE) 100 mL (100 mL Intravenous Given 11/13/23 0619)   heparin (porcine) injection 4,000 Units (4,000 Units Intravenous Given 11/13/23 0722)   fentanyl citrate (PF) 100 MCG/2ML 50 mcg (50 mcg Intravenous Given 11/13/23 0745)       Diagnostic Studies  Results Reviewed       Procedure Component Value Units Date/Time    High Sensitivity Troponin I Random [[id number]]  (Abnormal) Collected: 11/13/23 1704    Lab Status: Final result Specimen: Blood from Arm, Right Updated: 11/13/23 1730     HS TnI random 1,562 ng/L     High Sensitivity Troponin I Random [[id number]]  (Abnormal) Collected: 11/13/23 1510    Lab Status: Final result Specimen: Blood from Arm, Right Updated: 11/13/23 1540     HS TnI random 1,882 ng/L     HS Troponin I 4hr [[id number]]  (Abnormal) Collected: 11/13/23 1317    Lab Status: Final result Specimen: Blood from Arm, Right Updated: 11/13/23 1344     hs TnI 4hr 1,946 ng/L      Delta 4hr hsTnI 271 ng/L     APTT six (6) hours after Heparin bolus/drip initiation or dosing change [[id number]]  (Abnormal) Collected: 11/13/23 1306    Lab Status: Final result Specimen: Blood from Arm, Right Updated: 11/13/23 1340     PTT 46 seconds     HS Troponin I 2hr [[id number]]  (Abnormal) Collected: 11/13/23 1306    Lab Status: Final result Specimen: Blood from Arm, Right Updated: 11/13/23 1334     hs TnI 2hr 1,886 ng/L      Delta 2hr hsTnI 211 ng/L     HS Troponin 0hr (reflex protocol) [[id number]]  (Abnormal) Collected: 11/13/23 1102    Lab Status: Final result Specimen: Blood from Arm, Right Updated: 11/13/23 1134     hs TnI 0hr 1,675 ng/L     HS Troponin I 4hr [[id number]]  (Abnormal) Collected: 11/13/23 0904    Lab Status: Final result Specimen: Blood from Hand, Left Updated: 11/13/23 0930     hs TnI 4hr 1,175 ng/L      Delta 4hr hsTnI 239 ng/L     HS Troponin I 2hr [[id number]]  (Abnormal) Resulted: 11/13/23 0716    Lab Status: Final result Specimen: Blood Updated: 11/13/23 0716     hs TnI 2hr 1,169 ng/L      Delta 2hr hsTnI 233 ng/L     B-Type Natriuretic Peptide(BNP) [[id number]]  (Abnormal) Collected: 11/13/23 0507    Lab Status: Final result Specimen: Blood from Arm, Right Updated: 11/13/23 0555      pg/mL     HS Troponin 0hr (reflex protocol) [[id number]]  (Abnormal) Collected: 11/13/23 0507    Lab Status: Final result Specimen: Blood from Arm, Right Updated: 11/13/23 0547     hs TnI 0hr 936 ng/L     Comprehensive metabolic panel [[id number]]  (Abnormal) Collected: 11/13/23 0507    Lab Status: Final result Specimen: Blood from Arm, Right Updated: 11/13/23 0541     Sodium 139 mmol/L      Potassium 3.7 mmol/L      Chloride 106 mmol/L      CO2 26 mmol/L      ANION GAP 7 mmol/L      BUN 6 mg/dL      Creatinine 0.74 mg/dL      Glucose 156 mg/dL      Calcium 8.7 mg/dL      AST 15 U/L      ALT 8 U/L      Alkaline Phosphatase 51 U/L      Total Protein 6.3 g/dL      Albumin 3.9 g/dL      Total Bilirubin 0.42 mg/dL      eGFR 101 ml/min/1.73sq m     Narrative:      [place] guidelines for Chronic Kidney Disease (CKD):     Stage 1 with normal or high GFR (GFR > 90 mL/min/1.73 square meters)    Stage 2 Mild CKD (GFR = 60-89 mL/min/1.73 square meters)    Stage 3A Moderate CKD (GFR = 45-59 mL/min/1.73 square meters)    Stage 3B Moderate CKD (GFR = 30-44 mL/min/1.73 square meters)    Stage 4 Severe CKD (GFR = 15-29 mL/min/1.73 square meters)    Stage 5 End Stage CKD (GFR <15 mL/min/1.73 square meters)  Note: GFR calculation is accurate only with a steady state creatinine    D-Dimer [[id number]]  (Normal) Collected: 11/13/23 0507    Lab Status: Final result Specimen: Blood from Arm, Right Updated: 11/13/23 0538     D-Dimer, Quant 0.44 ug/ml FEU     Narrative: In the evaluation for possible pulmonary embolism, in the appropriate (Well's Score of 4 or less) patient, the age adjusted d-dimer cutoff for this patient can be calculated as:    Age x 0.01 (in ug/mL) for Age-adjusted D-dimer exclusion threshold for a patient over 50 years.     Protime-INR [[id number]]  (Normal) Collected: 11/13/23 0507    Lab Status: Final result Specimen: Blood from Arm, Right Updated: 11/13/23 0535     Protime 12.0 seconds      INR 0.86    APTT [[id number]]  (Normal) Collected: 11/13/23 0507    Lab Status: Final result Specimen: Blood from Arm, Right Updated: 11/13/23 0535     PTT 30 seconds     CBC and differential [[id number]]  (Abnormal) Collected: 11/13/23 0507    Lab Status: Final result Specimen: Blood from Arm, Right Updated: 11/13/23 0522     WBC 11.08 Thousand/uL      RBC 5.41 Million/uL      Hemoglobin 16.7 g/dL      Hematocrit 50.7 %      MCV 94 fL      MCH 30.9 pg      MCHC 32.9 g/dL      RDW 13.5 %      MPV 9.6 fL      Platelets 256 Thousands/uL      nRBC 0 /100 WBCs      Neutrophils Relative 59 %      Immat GRANS % 1 %      Lymphocytes Relative 23 %      Monocytes Relative 11 %      Eosinophils Relative 5 %      Basophils Relative 1 %      Neutrophils Absolute 6.71 Thousands/µL      Immature Grans Absolute 0.05 Thousand/uL      Lymphocytes Absolute 2.52 Thousands/µL      Monocytes Absolute 1.18 Thousand/µL      Eosinophils Absolute 0.53 Thousand/µL      Basophils Absolute 0.09 Thousands/µL CTA dissection protocol chest abdomen pelvis w wo contrast   Final Result by Wille Dandy, MD (32/88 0933)      1. No acute aortic dissection or intramural hematoma. 2. 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 Correlation with prior outside imaging can be made to assess for chronicity/stability. 3. Left upper lobe solid nodule measuring 4 mm. Correlate with prior outside imaging. If unavailable, based on current Fleischner Society 2017 Guidelines on incidental pulmonary nodule, no routine follow-up is needed if the patient is low risk. If the    patient is high risk, optional follow-up chest CT at 12 months can be considered. Workstation performed: [id number]                    Procedures  ECG 12 Lead Documentation Only    Date/Time: 11/13/2023 5:14 AM    Performed by: Fabiola La DO  Authorized by: Fabiola La DO    ECG reviewed by me, the ED Provider: yes    Patient location:  ED  Previous ECG:     Comparison to cardiac monitor: Yes    Quality:     Tracing quality:  Limited by artifact  Rate:     ECG rate:  92    ECG rate assessment: normal    Rhythm:     Rhythm: sinus rhythm    Ectopy:     Ectopy: PVCs    QRS:     QRS axis:  Left    QRS intervals: Wide  Conduction:     Conduction: abnormal      Abnormal conduction: incomplete RBBB    ST segments:     ST segments:  Non-specific  T waves:     T waves: non-specific             ED Course  ED Course as of 11/13/23 2053 Mon Nov 13, 2023   0602 Spoke with Dr. Matthew Hylton cardiology on-call reviewed case and findings in the emergency department management thus far he recommends initiate heparin drip and continue with nitrates as needed and transfer to Center with cardiac catheterization capabilities.     With patient and spouse in the ED and advised of recommendation for transfer request transfer to [facility] given prior cardiac surgery was there. Gurvinder Ewing with Dr. Julia Rikc cardiology on-call 1 Alver[address] Gao reviewed case and findings in the emergency department management thus far and cardiology recommendations except for transfer as long as CTA is negative for acute dissection otherwise would need transfer to cardiothoracic surgery service. HEART Risk Score      Flowsheet Row Most Recent Value   Heart Score Risk Calculator    History 0 Filed at: 11/13/2023 0513   ECG 1 Filed at: 11/13/2023 0513   Age 1 Filed at: 11/13/2023 0513   Risk Factors 2 Filed at: 11/13/2023 0398   Troponin --   HEART Score --                          SBIRT 20yo+      Flowsheet Row Most Recent Value   Initial Alcohol Screen: US AUDIT-C     1. How often do you have a drink containing alcohol? 0 Filed at: 11/13/2023 0501   2. How many drinks containing alcohol do you have on a typical day you are drinking? 0 Filed at: 11/13/2023 0501   3a. Male UNDER 65: How often do you have five or more drinks on one occasion? 0 Filed at: 11/13/2023 0501   3b. FEMALE Any Age, or MALE 65+: How often do you have 4 or more drinks on one occassion? 0 Filed at: 11/13/2023 0501   Audit-C Score 0 Filed at: 11/13/2023 0501   STEWART: How many times in the past year have you. .. Used an illegal drug or used a prescription medication for non-medical reasons? Never Filed at: 11/13/2023 0501                      Medical Decision Making  Amount and/or Complexity of Data Reviewed  Labs: ordered. Radiology: ordered. Risk  Prescription drug management.              Disposition  Final diagnoses:   NSTEMI (non-ST elevated myocardial infarction) [facility])     Time reflects when diagnosis was documented in both MDM as applicable and the Disposition within this note       Time User Action Codes Description Comment    11/13/2023  6:05 AM Courtney Sinha Add [I21.4] NSTEMI (non-ST elevated myocardial infarction) [facility])           ED Disposition       ED Disposition   Transfer to Another [address]    Condition   --    Date/Time   Mon Nov 13, 2023 Shelly should be transferred out to [facility].                MD Documentation      [facility] Most Recent Value   Patient Condition The patient has been stabilized such that within reasonable medical probability, no material deterioration of the patient condition or the condition of the unborn child(juan manuel) is likely to result from the transfer   Benefits of Transfer Specialized equipment and/or services available at the receiving facility (Include comment)________________________  [cath]   Risks of Transfer Possible worsening of condition or death during transfer, Potential deterioration of medical condition, Loss of IV, Increased discomfort during transfer, Potential for delay in receiving treatment   Accepting Physician [address] Name, [address] [facility]    (Name & Tel number) Joey Perkins at [address] [facility] [address] and [address]) ALS   Sending MD Ana Maria Trinidad   Provider Certification General risk, such as traffic hazards, adverse weather conditions, rough terrain or turbulence, possible failure of equipment (including vehicle or aircraft), or consequences of actions of persons outside the control of the transport personnel, Unanticipated needs of medical equipment and personnel during transport, Risk of worsening condition          RN Documentation      Flowsheet Row Most [address] [facility] NameJair    (Name & Tel number) Joey Perkins at [address] to Thierry Solares RN  [made aware of PTT redraw due at [address] [facility] [address] and [address]) ALS          Follow-up Information    None         Discharge Medication List as of 11/13/2023  6:16 PM        CONTINUE these medications which have NOT CHANGED    Details   acetaminophen (TYLENOL) 325 mg tablet Take 325 mg by mouth every 6 (six) hours as needed for mild pain, Historical Med aspirin (ECOTRIN LOW STRENGTH) 81 mg EC tablet Take 81 mg by mouth daily, Historical Med      busPIRone (BUSPAR) 10 mg tablet Take 10 mg by mouth as needed, Historical Med      clopidogrel (PLAVIX) 75 mg tablet Take 75 mg by mouth daily, Historical Med      gabapentin (NEURONTIN) 600 MG tablet Take 1,200 mg by mouth 2 (two) times a day, Historical Med      metoprolol tartrate (LOPRESSOR) 25 mg tablet Take 25 mg by mouth every 12 (twelve) hours, Historical Med      pantoprazole (PROTONIX) 40 mg tablet Take 40 mg by mouth daily, Historical Med      atorvastatin (LIPITOR) 40 mg tablet Take 40 mg by mouth daily, Historical Med      cyclobenzaprine (FLEXERIL) 10 mg tablet Take 10 mg by mouth 3 (three) times a day as needed for muscle spasms, Historical Med      ketorolac (TORADOL) 10 mg tablet Take 1 tablet (10 mg total) by mouth every 6 (six) hours as needed for mild pain or moderate pain, Starting Mon 4/19/2021, Normal             No discharge procedures on file.     PDMP Review       None            ED Provider  Electronically Signed by             Edie Lovell DO  11/13/23 2054

## 2023-11-14 LAB
ATRIAL RATE: 58 BPM
ATRIAL RATE: 59 BPM
ATRIAL RATE: 92 BPM
P AXIS: 72 DEGREES
P AXIS: 74 DEGREES
P AXIS: 78 DEGREES
PR INTERVAL: 128 MS
PR INTERVAL: 128 MS
PR INTERVAL: 134 MS
QRS AXIS: -78 DEGREES
QRS AXIS: -80 DEGREES
QRS AXIS: -82 DEGREES
QRSD INTERVAL: 90 MS
QRSD INTERVAL: 90 MS
QRSD INTERVAL: 96 MS
QT INTERVAL: 346 MS
QT INTERVAL: 446 MS
QT INTERVAL: 450 MS
QTC INTERVAL: 427 MS
QTC INTERVAL: 437 MS
QTC INTERVAL: 445 MS
T WAVE AXIS: 228 DEGREES
T WAVE AXIS: 234 DEGREES
T WAVE AXIS: 237 DEGREES
VENTRICULAR RATE: 58 BPM
VENTRICULAR RATE: 59 BPM
VENTRICULAR RATE: 92 BPM

## 2023-12-07 RX ORDER — CITALOPRAM HYDROBROMIDE 10 MG/1
10 TABLET ORAL
COMMUNITY

## 2023-12-11 ENCOUNTER — CONSULT (OUTPATIENT)
Dept: PAIN MEDICINE | Facility: CLINIC | Age: 58
End: 2023-12-11
Payer: COMMERCIAL

## 2023-12-11 ENCOUNTER — HOSPITAL ENCOUNTER (OUTPATIENT)
Dept: RADIOLOGY | Facility: CLINIC | Age: 58
Discharge: HOME | End: 2023-12-11
Payer: COMMERCIAL

## 2023-12-11 VITALS
SYSTOLIC BLOOD PRESSURE: 142 MMHG | DIASTOLIC BLOOD PRESSURE: 78 MMHG | BODY MASS INDEX: 30.05 KG/M2 | TEMPERATURE: 98.6 F | HEART RATE: 56 BPM | HEIGHT: 66 IN | WEIGHT: 187 LBS | OXYGEN SATURATION: 98 %

## 2023-12-11 DIAGNOSIS — M47.816 LUMBAR SPONDYLOSIS: ICD-10-CM

## 2023-12-11 DIAGNOSIS — M47.816 LUMBAR SPONDYLOSIS: Primary | ICD-10-CM

## 2023-12-11 DIAGNOSIS — M21.371 RIGHT FOOT DROP: ICD-10-CM

## 2023-12-11 DIAGNOSIS — M54.16 LUMBAR RADICULOPATHY: ICD-10-CM

## 2023-12-11 PROBLEM — M21.379 FOOT DROP: Status: ACTIVE | Noted: 2023-12-11

## 2023-12-11 PROCEDURE — 99204 OFFICE O/P NEW MOD 45 MIN: CPT | Performed by: ANESTHESIOLOGY

## 2023-12-11 PROCEDURE — 72100 X-RAY EXAM L-S SPINE 2/3 VWS: CPT

## 2023-12-11 RX ORDER — TICAGRELOR 90 MG/1
90 TABLET ORAL 2 TIMES DAILY
COMMUNITY
Start: 2023-11-16

## 2023-12-11 RX ORDER — ATORVASTATIN CALCIUM 80 MG/1
TABLET, FILM COATED ORAL
COMMUNITY
Start: 2023-11-16

## 2023-12-11 RX ORDER — NITROGLYCERIN 0.4 MG/1
0.4 TABLET SUBLINGUAL
COMMUNITY
Start: 2023-11-16

## 2023-12-11 RX ORDER — METOPROLOL SUCCINATE 50 MG/1
TABLET, EXTENDED RELEASE ORAL
COMMUNITY
Start: 2023-11-16

## 2023-12-11 RX ORDER — PANTOPRAZOLE SODIUM 20 MG/1
TABLET, DELAYED RELEASE ORAL
COMMUNITY
Start: 2023-11-16

## 2023-12-11 NOTE — PATIENT INSTRUCTIONS
Core Strengthening Exercises   WHAT YOU NEED TO KNOW:   Your core includes the muscles of your lower back, hip, pelvis, and abdomen. Core strengthening exercises help heal and strengthen these muscles. This helps prevent another injury, and keeps your pelvis, spine, and hips in the correct position. DISCHARGE INSTRUCTIONS:   Call your doctor or physical therapist if:   You have sharp or worsening pain during exercise or at rest.    You have questions or concerns about your shoulder exercises. Safety tips:  Talk to your healthcare provider before you start an exercise program. A physical therapist can teach you how to do core strengthening exercises safely. Do the exercises on a mat or firm surface. A firm surface will support your spine and prevent low back pain. Do not do these exercises on a bed. Move slowly and smoothly. Avoid fast or jerky motions. Stop if you feel pain. You may feel some discomfort at first, but you should not feel pain. Tell your provider or physical therapist if you have pain while you exercise. Regular exercise will help decrease your discomfort over time. Breathe normally during core exercises. Do not hold your breath. This may cause an increase in blood pressure and prevent muscle strengthening. Your healthcare provider will tell you when to inhale and exhale during the exercise. Begin all of your exercises with abdominal bracing. Abdominal bracing helps warm up your core muscles. You can also practice abdominal bracing throughout the day. Lie on your back with your knees bent and feet flat on the floor. Place your arms in a relaxed position beside your body. Tighten your abdominal muscles. Pull your belly button in and up toward your spine. Hold for 5 seconds. Relax your muscles. Repeat 10 times. Core strengthening exercises: Your healthcare provider will tell you how often to do these exercises.  The provider will also tell you how many repetitions of each exercise you should do. Hold each exercise for 5 seconds or as directed. As you get stronger, increase your hold to 10 to 15 seconds. You can do some of these exercises on a stability ball, or with a weight. Ask your healthcare provider how to use a stability ball or weight for these exercises:  Bridging:  Lie on your back with your knees bent and feet flat on the floor. Rest your arms at your side. Tighten your buttocks, and then lift your hips 1 inch off the floor. Hold for 5 seconds. When you can do this exercise without pain for 10 seconds, increase the distance you lift your hips. A good goal is to be able to lift your hips so that your shoulders, hips, and knees are in a straight line. Dead bug:  Lie on your back with your knees bent and feet flat on the floor. Place your arms in a relaxed position beside your body. Begin with abdominal bracing. Next, raise one leg, keeping your knee bent. Hold for 5 seconds. Repeat with the other leg. When you can do this exercise without pain for 10 to 15 seconds, you may raise one straight leg and hold. Repeat with the other leg. Quadruped:  Place your hands and knees on the floor. Keep your wrists directly below your shoulders and your knees directly below your hips. Pull your belly button in toward your spine. Do not flatten or arch your back. Tighten your abdominal muscles below your belly button. Hold for 5 seconds. When you can do this exercise without pain for 10 to 15 seconds, you may extend one arm and hold. Repeat on the other side. Side bridge exercises:      Standing side bridge:  Stand next to a wall and extend one arm toward the wall. Place your palm flat on the wall with your fingers pointing upward. Begin with abdominal bracing. Next, without moving your feet, slowly bend your arm to 90 degrees. Hold for 5 seconds. Repeat on the other side.  When you can do this exercise without pain for 10 to 15 seconds, you may do the bent leg side bridge on the floor. Bent leg side bridge:  Lie on one side with your legs, hips, and shoulders in a straight line. Prop yourself up onto your forearm so your elbow is directly below your shoulder. Bend your knees back to 90 degrees. Begin with abdominal bracing. Next, lift your hips and balance yourself on your forearm and knees. Hold for 5 seconds. Repeat on the other side. When you can do this exercise without pain for 10 to 15 seconds, you may do the straight leg side bridge on the floor. Straight leg side bridge:  Lie on one side with your legs, hips, and shoulders in a straight line. Prop yourself up onto your forearm so your elbow is directly below your shoulder. Begin with abdominal bracing. Lift your hips off the floor and balance yourself on your forearm and the outside of your flexed foot. Do not let your ankle bend sideways. Hold for 5 seconds. Repeat on the other side. When you can do this exercise without pain for 10 to 15 seconds, ask your healthcare provider for more advanced exercises. Superman:  Lie on your stomach. Extend your arms forward on the floor. Tighten your abdominal muscles and lift your right hand and left leg off the floor. Hold this position. Slowly return to the starting position. Tighten your abdominal muscles and lift your left hand and right leg off the floor. Hold this position. Slowly return to the starting position. Clam:  Lie on your side with your knees bent. Put your bottom arm under your head to keep your neck in line. Put your top hand on your hip to keep your pelvis from moving. Put your heels together, and keep them together during this exercise. Slowly raise your top knee toward the ceiling. Then lower your leg so your knees are together. Repeat this exercise 10 times. Then switch sides and do the exercise 10 times with the other leg. Curl up:  Lie on your back with your knees bent and feet flat on the floor.  Place your hands, palms down, underneath your lower back. Next, with your elbows on the floor, lift your shoulders and chest 2 to 3 inches off the floor. Keep your head in line with your shoulders. Hold this position. Slowly return to the starting position. Straight leg raises:  Lie on your back with one leg straight. Bend the other knee and place your foot flat on the floor. Tighten your abdominal muscles. Keep your leg straight and slowly lift it straight up 6 to 12 inches off the floor. Hold this position. Lower your leg slowly. Do as many repetitions as directed on this side. Repeat with the other leg. Plank:  Lie on your stomach. Bend your elbows and place your forearms flat on the floor. Lift your chest, stomach, and knees off the floor. Make sure your elbows are below your shoulders. Your body should be in a straight line. Do not let your hips or lower back sink to the ground. Squeeze your abdominal muscles together and hold for 15 seconds. To make this exercise harder, hold for 30 seconds or lift 1 leg at a time. Bicycles:  Lie on your back. Bend both knees and bring them toward your chest. Your calves should be parallel to the floor. Place the palms of your hands on the back of your head. Straighten your right leg and keep it lifted 2 inches off the floor. Raise your head and shoulders off the floor and twist towards your left. Keep your head and shoulders lifted. Bend your right knee while you straighten your left leg. Keep your left leg 2 inches off the floor. Twist your head and chest towards the left leg. Continue to straighten 1 leg at a time and twist.       Follow up with your doctor or physical therapist as directed:  Write down your questions so you remember to ask them during your visits. © Copyright [employer] 2023 Information is for End User's use only and may not be sold, redistributed or otherwise used for commercial purposes. The above information is an  only.  It is not intended as medical advice for individual conditions or treatments. Talk to your doctor, nurse or pharmacist before following any medical regimen to see if it is safe and effective for you.

## 2023-12-11 NOTE — PROGRESS NOTES
Assessment  1. Lumbar spondylosis    2. Lumbar radiculopathy    Right sided lumbar radicular pain in the L4-S1 dermatomal distribution accompanied by pain limited weakness numbness and paresthesias. Patient has not yet participated with PT recently. Chronic pain with decreased participation with IADLs over the past 3 months. Has been taking OTC tylenol in addition to gabapentin and muscle relaxers with modest benefit. Right foot drop since 2017, otherwise 5/5 strength bilaterally in lower extremities, positive SLR right-sided. Reflexes 2+. Additionally there is positive facet loading, right greater than left. Denies any gait instability, saddle anesthesia. On MRI imaging from 2017 there is varying disc narrowing and desiccation from L2-3 through L5-S1. There is mild disc bulging at these levels without central spinal stenosis. Additionally there is small broad-based right foraminal disc protrusion at L4-L5 with left foraminal disc protrusion at L5-S1. Disc material mildly compresses the right L4 and left L5 nerve roots. Remaining foramina are patent. Risks, benefits alternatives epidural steroid injections thoroughly discussed with patient. Handouts provided questions answered to patient's satisfaction. Lifestyle modifications extensively discussed including diet, exercise and weight loss in addition to core strengthening and smoking cessation. Unfortunately given recent cardiac stenting, will not be a candidate for THI for 6 months from now (may). Will proceed with multimodal pain therapy plan as noted below:    Plan  -f/u after 6 weeks of PT to consider MRI lumbar spine noncontrast  -EMG RLE; will f/u result with patient  -xray lumbar spine; will f/u result with patient  -gabapentin 600 mg q.i.d. previously ordered for patient; counseled regarding sedative effects of taking this medication and provided up titration calendar.   Counseled not to take medication while driving or operating heavy machinery/using stairs  -script provided for formal physical therapy for right-sided lumbar radiculopathy; Physician directed home exercise plan as per [employer] demonstrated and handouts provided that patient plans to participate with for 1 hour, twice a week for the next 6 weeks. There are risks associated with opioid medications, including dependence, addiction and tolerance. The patient understands and agrees to use these medications only as prescribed. Potential side effects of the medications include, but are not limited to, constipation, drowsiness, addiction, impaired judgment and risk of fatal overdose if not taken as prescribed. The patient was warned against driving while taking sedation medications or operating heavy machinery. The patient voiced understanding. Sharing medications is a felony. At this point in time, the patient is showing no signs of addiction, abuse, diversion or suicidal ideation. [place] Prescription Drug Monitoring Program report was reviewed and was appropriate      Complete risks and benefits including bleeding, infection, tissue reaction, nerve injury and allergic reaction were discussed. The approach was demonstrated using models and literature was provided. Verbal and written consent was obtained. My impressions and treatment recommendations were discussed in detail with the patient who verbalized understanding and had no further questions. Discharge instructions were provided. I personally saw and examined the patient and I agree with the above discussed plan of care.     New Medications Ordered This Visit   Medications    citalopram (CeleXA) 10 mg tablet     Sig: Take 10 mg by mouth    metoprolol succinate (TOPROL-XL) 50 mg 24 hr tablet    nitroglycerin (NITROSTAT) 0.4 mg SL tablet     Si.4 mg PRN    Brilinta 90 MG     Si mg 2 (two) times a day    atorvastatin (LIPITOR) 80 mg tablet    pantoprazole (PROTONIX) 20 mg tablet       History of Present Illness    Regina Contreras is a 62 y.o. male with pmhx of CAD s/p stenting in 2017 and recently in November of 2023, current smoker, presenting with chronic right sided lumbar radicular pain in the L4 through S1 dermatomal distributions. Debilitating pain limited weakness numbness and paresthesias accompany the pain. The patient rates the pain at a 8/10 accompanied by electric shock-like shooting features and crampy burning pain in the aforementioned dermatomal distributions. The pain is worse in the mornings as well as the end of the day; exertion such as walking for long periods of time seems to exacerbate the pain. The patient can hardly walk more than a few blocks without having debilitating pain. He tries to maintain an active lifestyle and finds that the current degree of pain seems to compromises his efforts. The pain significantly impacts independent activities of daily living and contributes to significant disability. He has attempted physical therapy with exacerbation of the pain in the past.  He has taken nsaids, tylenol as well as gabapentin with limited relief of the pain as well. He has never tried epidural steroid injections in the past. He denies any bowel or bladder dysfunction/incontinence, saddle anesthesia or gait instability. I have personally reviewed and/or updated the patient's past medical history, past surgical history, family history, social history, current medications, allergies, and vital signs today. Review of Systems   Constitutional:  Positive for activity change. HENT: Negative. Eyes: Negative. Respiratory: Negative. Cardiovascular: Negative. Gastrointestinal: Negative. Endocrine: Negative. Genitourinary: Negative. Musculoskeletal:  Positive for arthralgias, back pain, gait problem and myalgias. Skin: Negative. Allergic/Immunologic: Negative. Neurological:  Positive for weakness and numbness. Hematological: Negative. Psychiatric/Behavioral: Negative. All other systems reviewed and are negative. Patient Active Problem List   Diagnosis    Lumbar spondylosis    Lumbar radiculopathy       Past Medical History:   Diagnosis Date    Coronary artery disease     Foot drop, right foot        Past Surgical History:   Procedure Laterality Date    CORONARY ANGIOPLASTY WITH STENT PLACEMENT  2009    ILIAC ARTERY STENT Bilateral 01/24/2017    NECK SURGERY      screws and plates    TONSILLECTOMY         History reviewed. No pertinent family history.     Social History     Occupational History    Not on file   Tobacco Use    Smoking status: Every Day     Packs/day: 1.50     Types: Cigarettes    Smokeless tobacco: Never   Vaping Use    Vaping Use: Never used   Substance and Sexual Activity    Alcohol use: Not Currently    Drug use: Yes     Types: Marijuana    Sexual activity: Not on file       Current Outpatient Medications on File Prior to Visit   Medication Sig    acetaminophen (TYLENOL) 325 mg tablet Take 325 mg by mouth every 6 (six) hours as needed for mild pain    aspirin (ECOTRIN LOW STRENGTH) 81 mg EC tablet Take 81 mg by mouth daily    atorvastatin (LIPITOR) 80 mg tablet     Brilinta 90 MG 90 mg 2 (two) times a day    busPIRone (BUSPAR) 10 mg tablet Take 10 mg by mouth as needed    citalopram (CeleXA) 10 mg tablet Take 10 mg by mouth    gabapentin (NEURONTIN) 600 MG tablet Take 2,400 mg by mouth 4 (four) times a day    metoprolol succinate (TOPROL-XL) 50 mg 24 hr tablet     nitroglycerin (NITROSTAT) 0.4 mg SL tablet 0.4 mg PRN    pantoprazole (PROTONIX) 20 mg tablet     [DISCONTINUED] atorvastatin (LIPITOR) 40 mg tablet Take 40 mg by mouth daily (Patient not taking: Reported on 11/13/2023)    [DISCONTINUED] clopidogrel (PLAVIX) 75 mg tablet Take 75 mg by mouth daily (Patient not taking: Reported on 12/11/2023)    [DISCONTINUED] cyclobenzaprine (FLEXERIL) 10 mg tablet Take 10 mg by mouth 3 (three) times a day as needed for muscle spasms (Patient not taking: Reported on 11/13/2023)    [DISCONTINUED] ketorolac (TORADOL) 10 mg tablet Take 1 tablet (10 mg total) by mouth every 6 (six) hours as needed for mild pain or moderate pain (Patient not taking: Reported on 11/13/2023)    [DISCONTINUED] metoprolol tartrate (LOPRESSOR) 25 mg tablet Take 25 mg by mouth every 12 (twelve) hours (Patient not taking: Reported on 12/11/2023)    [DISCONTINUED] pantoprazole (PROTONIX) 40 mg tablet Take 40 mg by mouth daily (Patient not taking: Reported on 12/11/2023)     No current facility-administered medications on file prior to visit. No Known Allergies      Physical Exam    /78 (BP Location: Left arm, Patient Position: Sitting, Cuff Size: Adult)   Pulse 56   Temp 98.6 °F (37 °C)   Ht 5' 6" (1.676 m)   Wt 84.8 kg (187 lb)   SpO2 98%   BMI 30.18 kg/m²     Constitutional: normal, well developed, well nourished, alert, in no distress and non-toxic and no overt pain behavior. and obese  Eyes: anicteric  HEENT: grossly intact  Neck: supple, symmetric, trachea midline and no masses   Pulmonary:even and unlabored  Cardiovascular:No edema or pitting edema present  Skin:Normal without rashes or lesions and well hydrated  Psychiatric:Mood and affect appropriate  Neurologic:Cranial Nerves II-XII grossly intact Sensation grossly intact; no clonus negative freitas's. Reflexes 2+ and brisk. SLR positive right sided  Musculoskeletal:antalgic gait. Right foot drop since 2017, otherwise 5/5 strength bilaterally with AROM in lower extremities. Unable to perform normal heel toe and tip toe walking. Significant pain with lumbar facet loading bilaterally and with lateral spine rotation, right greater than left. ttp over lumbar paraspinal muscles. Negative jose's test, negative gaenslen's negative SIJ loading bilaterally. Imaging    MR imaging of the lumbar spine was obtained on a 1.5 Marilin magnet.    Sagittal T1-weighted, T2-weighted, and inversion recovery imaging is followed   by axial T2 weighted imaging. Comment: For numbering purposes will be assumed there are 5 lumbar vertebrae. There is very mild disc narrowing and desiccation at T11-T12. There is very   minimal disc bulging at this level without central spinal stenosis. T12-L1 and   L1-L2 disc levels are unremarkable. At L2-L3 there is slight narrowing and desiccation of the disc. Mild disc   bulging is seen. There is no central spinal stenosis. Foramina are patent. At L3-L4 there is slight narrowing and desiccation of the disc with very mild   disc bulging but no central spinal stenosis. There is inferior foraminal   narrowing to a very mild degree but no nerve root compression is seen. At L4-L5 there is slight narrowing and desiccation of the disc and very mild   disc bulging without central spinal stenosis. There is mild foraminal disc   protrusion on the right which can be mildly compressing the right L4 nerve root   within the foramen. This should be correlated with any symptoms referrable to   that nerve root. Left neural foramen without nerve root compression. There is disc narrowing at L5-S1 with desiccation. Disc bulging is present. However there is no extrinsic compression on the thecal sac. On the left there   is foraminal disc protrusion which obliterates the foraminal fat and can be   mildly compressing the left L5 nerve root. On the right, there is disc bulging   and spurring extending into the inferior foramen and this partially obliterates   foraminal fat but is without extrinsic compression on the right L5 nerve root. Conus terminates at the L1 level. Nerve roots of the cauda equina appear   normal. No evidence of intraspinal mass. Vertebrae demonstrate normal marrow signal with no marrow edema.

## 2023-12-12 ENCOUNTER — TELEPHONE (OUTPATIENT)
Dept: RADIOLOGY | Facility: CLINIC | Age: 58
End: 2023-12-12

## 2023-12-12 NOTE — TELEPHONE ENCOUNTER
----- Message from Milly Rodriguez MD sent at 12/12/2023 11:17 AM EST -----  Multilevel degenerative changes in lumbar spine; there is a metal artifact noted near his iliac arteries that he should f/u with vascular; please relay to patient and also to follow up after 6 weeks PT, thanks   ----- Message -----  From: Interface, Radiology Results In  Sent: 12/11/2023   6:42 PM EST  To: Milly Rodriguez MD

## 2023-12-14 ENCOUNTER — TELEPHONE (OUTPATIENT)
Age: 58
End: 2023-12-14

## 2023-12-14 NOTE — TELEPHONE ENCOUNTER
S/w Pt's wife Leann Guardadoyesenia, advised to obtain vascular clearance before beginning PT. Pt then to complete 6wks of Pt and f/u with [facility]. Pt's wife expressed understanding.

## 2023-12-14 NOTE — TELEPHONE ENCOUNTER
S/w Pt's wife Reji Clay, please advise if Pt should continue PT or wait for vascular OV on 12/28. Pt reports 10/10 pain from R groin, down leg, into foot. Pt currently taking gabapentin and tylenol 1,000mg TID with no relief. Please review.

## 2023-12-14 NOTE — TELEPHONE ENCOUNTER
Per Medical Communication Consent Form, S/w Pt's Wife Rashaun Lake - advised of the same. Bea verbalized understanding.

## 2023-12-14 NOTE — TELEPHONE ENCOUNTER
Caller: [facility] (pt's wife)    Doctor: Alvarez Siddiqui    Reason for call: pt wife wanted to know if anything can be prescribed for the pain, pt has an appt with the vascular surgeon on 12/28/23. Pt wife also wants to know if pt should hold off on starting the Physical Therapy. Please Advise.     Call back#: [phone]

## 2025-02-18 NOTE — ED NOTES
4hr troponin collected early by mistake. New troponin order placed for 1500 for 4hr troponin result.       Katy Araiza RN  11/13/23 9721
Pt provided ice water. Per provider pt allowed fluids at this time.      Era Rod RN  11/13/23 3513
Pt requesting to leave, provider at bedside.       Pravin Echeverria RN  11/13/23 9079
Pt resting with call bell in reach. Wife at bedside. Pt offers no complaints at this time.       Josefina Santana RN  11/13/23 3559
DISPLAY PLAN FREE TEXT

## 2025-03-04 ENCOUNTER — RX ONLY (RX ONLY)
Age: 60
End: 2025-03-04

## 2025-03-04 ENCOUNTER — DOCTOR'S OFFICE (OUTPATIENT)
Dept: URBAN - NONMETROPOLITAN AREA CLINIC 1 | Facility: CLINIC | Age: 60
Setting detail: OPHTHALMOLOGY
End: 2025-03-04
Payer: COMMERCIAL

## 2025-03-04 DIAGNOSIS — H25.13: ICD-10-CM

## 2025-03-04 DIAGNOSIS — H40.013: ICD-10-CM

## 2025-03-04 DIAGNOSIS — H02.834: ICD-10-CM

## 2025-03-04 DIAGNOSIS — H04.123: ICD-10-CM

## 2025-03-04 DIAGNOSIS — H35.373: ICD-10-CM

## 2025-03-04 DIAGNOSIS — H02.831: ICD-10-CM

## 2025-03-04 PROCEDURE — 92134 CPTRZ OPH DX IMG PST SGM RTA: CPT | Performed by: OPHTHALMOLOGY

## 2025-03-04 PROCEDURE — 99204 OFFICE O/P NEW MOD 45 MIN: CPT | Performed by: OPHTHALMOLOGY

## 2025-03-04 ASSESSMENT — KERATOMETRY
OD_K2POWER_DIOPTERS: 46.25
OS_K1POWER_DIOPTERS: 45.75
OD_AXISANGLE_DEGREES: 135
OS_AXISANGLE_DEGREES: 058
OD_K1POWER_DIOPTERS: 45.50
OS_K2POWER_DIOPTERS: 46.75

## 2025-03-04 ASSESSMENT — REFRACTION_AUTOREFRACTION
OS_CYLINDER: -0.50
OS_AXIS: 132
OS_SPHERE: -1.00

## 2025-03-04 ASSESSMENT — LID POSITION - PTOSIS
OS_PTOSIS: ABSENT
OD_PTOSIS: ABSENT

## 2025-03-04 ASSESSMENT — VISUAL ACUITY
OS_BCVA: 20/200+1
OD_BCVA: 20/30+2

## 2025-03-04 ASSESSMENT — CONFRONTATIONAL VISUAL FIELD TEST (CVF)
OS_FINDINGS: FULL
OD_FINDINGS: FULL

## 2025-03-04 ASSESSMENT — LID POSITION - DERMATOCHALASIS
OS_DERMATOCHALASIS: 1+
OD_DERMATOCHALASIS: 1+

## 2025-03-04 ASSESSMENT — DRY EYES - PHYSICIAN NOTES
OD_GENERALCOMMENTS: 1+ PEE
OS_GENERALCOMMENTS: 1+ PEE

## 2025-03-27 ENCOUNTER — DOCTOR'S OFFICE (OUTPATIENT)
Dept: URBAN - NONMETROPOLITAN AREA CLINIC 1 | Facility: CLINIC | Age: 60
Setting detail: OPHTHALMOLOGY
End: 2025-03-27
Payer: COMMERCIAL

## 2025-03-27 DIAGNOSIS — H25.11: ICD-10-CM

## 2025-03-27 PROCEDURE — 92136 OPHTHALMIC BIOMETRY: CPT | Mod: RT | Performed by: OPHTHALMOLOGY

## 2025-03-28 ASSESSMENT — REFRACTION_AUTOREFRACTION
OS_SPHERE: -1.00
OS_AXIS: 132
OS_CYLINDER: -0.50

## 2025-03-28 ASSESSMENT — KERATOMETRY
OS_K2POWER_DIOPTERS: 46.75
OD_K2POWER_DIOPTERS: 46.25
OD_AXISANGLE_DEGREES: 135
OS_AXISANGLE_DEGREES: 058
OS_K1POWER_DIOPTERS: 45.75
OD_K1POWER_DIOPTERS: 45.50

## 2025-03-28 ASSESSMENT — VISUAL ACUITY
OS_BCVA: 20/200+1
OD_BCVA: 20/30+2

## 2025-04-08 ENCOUNTER — AMBUL SURGICAL CARE (OUTPATIENT)
Dept: URBAN - NONMETROPOLITAN AREA SURGERY 1 | Facility: SURGERY | Age: 60
Setting detail: OPHTHALMOLOGY
End: 2025-04-08
Payer: COMMERCIAL

## 2025-04-08 DIAGNOSIS — H25.11: ICD-10-CM

## 2025-04-08 PROCEDURE — G8907 PT DOC NO EVENTS ON DISCHARG: HCPCS | Mod: RT | Performed by: CLINIC/CENTER

## 2025-04-08 PROCEDURE — G8918 PT W/O PREOP ORDER IV AB PRO: HCPCS | Mod: RT | Performed by: CLINIC/CENTER

## 2025-04-08 PROCEDURE — G8918 PT W/O PREOP ORDER IV AB PRO: HCPCS | Mod: RT | Performed by: OPHTHALMOLOGY

## 2025-04-08 PROCEDURE — 66984 XCAPSL CTRC RMVL W/O ECP: CPT | Mod: SG,RT | Performed by: CLINIC/CENTER

## 2025-04-08 PROCEDURE — 66984 XCAPSL CTRC RMVL W/O ECP: CPT | Mod: RT | Performed by: OPHTHALMOLOGY

## 2025-04-08 PROCEDURE — G8907 PT DOC NO EVENTS ON DISCHARG: HCPCS | Mod: RT | Performed by: OPHTHALMOLOGY

## 2025-04-09 ENCOUNTER — RX ONLY (RX ONLY)
Age: 60
End: 2025-04-09

## 2025-04-09 ENCOUNTER — DOCTOR'S OFFICE (OUTPATIENT)
Dept: URBAN - NONMETROPOLITAN AREA CLINIC 1 | Facility: CLINIC | Age: 60
Setting detail: OPHTHALMOLOGY
End: 2025-04-09
Payer: COMMERCIAL

## 2025-04-09 DIAGNOSIS — Z96.1: ICD-10-CM

## 2025-04-09 DIAGNOSIS — H25.12: ICD-10-CM

## 2025-04-09 DIAGNOSIS — H26.40: ICD-10-CM

## 2025-04-09 PROBLEM — H02.834 DERMATOCHALASIS; RIGHT UPPER LID, LEFT UPPER LID: Status: ACTIVE | Noted: 2025-03-04

## 2025-04-09 PROBLEM — H40.013 GLAUCOMA SUSPECT, LOW RISK; BOTH EYES: Status: ACTIVE | Noted: 2025-03-04

## 2025-04-09 PROBLEM — H35.373 ERM; BOTH EYES: Status: ACTIVE | Noted: 2025-03-04

## 2025-04-09 PROBLEM — H02.831 DERMATOCHALASIS; RIGHT UPPER LID, LEFT UPPER LID: Status: ACTIVE | Noted: 2025-03-04

## 2025-04-09 PROBLEM — H04.123 DRY EYE SYNDROME LACRIMAL GLAND; BOTH EYES: Status: ACTIVE | Noted: 2025-03-04

## 2025-04-09 PROCEDURE — 92136 OPHTHALMIC BIOMETRY: CPT | Mod: 26,LT | Performed by: OPHTHALMOLOGY

## 2025-04-09 PROCEDURE — 99024 POSTOP FOLLOW-UP VISIT: CPT | Performed by: OPHTHALMOLOGY

## 2025-04-09 ASSESSMENT — KERATOMETRY
OD_K2POWER_DIOPTERS: 48.00
OD_K1POWER_DIOPTERS: 45.75
OS_AXISANGLE_DEGREES: 093
OS_K2POWER_DIOPTERS: 50.25
OD_AXISANGLE_DEGREES: 073
OS_K1POWER_DIOPTERS: 46.00

## 2025-04-09 ASSESSMENT — REFRACTION_AUTOREFRACTION
OS_AXIS: 132
OS_SPHERE: -1.00
OS_CYLINDER: -0.50

## 2025-04-09 ASSESSMENT — VISUAL ACUITY
OD_BCVA: 20/25-1
OS_BCVA: 20/50+2

## 2025-04-09 ASSESSMENT — LID POSITION - DERMATOCHALASIS
OS_DERMATOCHALASIS: 1+
OD_DERMATOCHALASIS: 1+

## 2025-04-09 ASSESSMENT — LID POSITION - PTOSIS
OD_PTOSIS: ABSENT
OS_PTOSIS: ABSENT

## 2025-04-21 ENCOUNTER — DOCTOR'S OFFICE (OUTPATIENT)
Dept: URBAN - NONMETROPOLITAN AREA CLINIC 1 | Facility: CLINIC | Age: 60
Setting detail: OPHTHALMOLOGY
End: 2025-04-21
Payer: COMMERCIAL

## 2025-04-21 DIAGNOSIS — H25.12: ICD-10-CM

## 2025-04-21 DIAGNOSIS — H26.40: ICD-10-CM

## 2025-04-21 DIAGNOSIS — Z96.1: ICD-10-CM

## 2025-04-21 PROCEDURE — 99024 POSTOP FOLLOW-UP VISIT: CPT | Performed by: OPHTHALMOLOGY

## 2025-04-21 ASSESSMENT — KERATOMETRY
OD_K2POWER_DIOPTERS: 46.00
OD_AXISANGLE_DEGREES: 124
OD_K1POWER_DIOPTERS: 45.50
OS_K2POWER_DIOPTERS: 46.75
OS_AXISANGLE_DEGREES: 062
OS_K1POWER_DIOPTERS: 45.50

## 2025-04-21 ASSESSMENT — VISUAL ACUITY
OS_BCVA: 20/20-2
OD_BCVA: 20/30

## 2025-04-21 ASSESSMENT — REFRACTION_AUTOREFRACTION
OD_AXIS: 038
OD_SPHERE: +1.25
OD_CYLINDER: -0.75
OS_SPHERE: -1.00
OS_AXIS: 132
OS_CYLINDER: -0.50

## 2025-04-21 ASSESSMENT — LID POSITION - PTOSIS
OD_PTOSIS: ABSENT
OS_PTOSIS: ABSENT

## 2025-04-21 ASSESSMENT — LID POSITION - DERMATOCHALASIS
OD_DERMATOCHALASIS: 1+
OS_DERMATOCHALASIS: 1+

## 2025-04-28 ENCOUNTER — AMBUL SURGICAL CARE (OUTPATIENT)
Dept: URBAN - NONMETROPOLITAN AREA SURGERY 1 | Facility: SURGERY | Age: 60
Setting detail: OPHTHALMOLOGY
End: 2025-04-28
Payer: COMMERCIAL

## 2025-04-28 DIAGNOSIS — H25.12: ICD-10-CM

## 2025-04-28 PROCEDURE — 66984 XCAPSL CTRC RMVL W/O ECP: CPT | Mod: SG,LT | Performed by: CLINIC/CENTER

## 2025-04-28 PROCEDURE — 66984 XCAPSL CTRC RMVL W/O ECP: CPT | Mod: 79,LT | Performed by: OPHTHALMOLOGY

## 2025-04-28 PROCEDURE — G8907 PT DOC NO EVENTS ON DISCHARG: HCPCS | Mod: LT | Performed by: OPHTHALMOLOGY

## 2025-04-28 PROCEDURE — G8918 PT W/O PREOP ORDER IV AB PRO: HCPCS | Mod: LT | Performed by: OPHTHALMOLOGY

## 2025-04-28 PROCEDURE — G8907 PT DOC NO EVENTS ON DISCHARG: HCPCS | Mod: LT | Performed by: CLINIC/CENTER

## 2025-04-28 PROCEDURE — G8918 PT W/O PREOP ORDER IV AB PRO: HCPCS | Mod: LT | Performed by: CLINIC/CENTER

## 2025-04-29 ENCOUNTER — DOCTOR'S OFFICE (OUTPATIENT)
Dept: URBAN - NONMETROPOLITAN AREA CLINIC 1 | Facility: CLINIC | Age: 60
Setting detail: OPHTHALMOLOGY
End: 2025-04-29
Payer: COMMERCIAL

## 2025-04-29 DIAGNOSIS — Z96.1: ICD-10-CM

## 2025-04-29 PROBLEM — H26.40 POSTERIOR CAPSULAR OPACIFICATION ; RIGHT EYE: Status: ACTIVE | Noted: 2025-04-09

## 2025-04-29 PROCEDURE — 99024 POSTOP FOLLOW-UP VISIT: CPT | Performed by: OPHTHALMOLOGY

## 2025-04-29 ASSESSMENT — LID POSITION - PTOSIS
OD_PTOSIS: ABSENT
OS_PTOSIS: ABSENT

## 2025-04-29 ASSESSMENT — KERATOMETRY
OD_K1POWER_DIOPTERS: 45.50
OD_AXISANGLE_DEGREES: 124
OS_K2POWER_DIOPTERS: 46.75
OS_AXISANGLE_DEGREES: 062
OD_K2POWER_DIOPTERS: 46.00
OS_K1POWER_DIOPTERS: 45.50

## 2025-04-29 ASSESSMENT — REFRACTION_AUTOREFRACTION
OD_CYLINDER: -0.75
OS_SPHERE: +1.00
OS_AXIS: 154
OD_AXIS: 028
OS_CYLINDER: -1.25
OD_SPHERE: +1.00

## 2025-04-29 ASSESSMENT — VISUAL ACUITY
OD_BCVA: 20/70
OS_BCVA: 20/20-1

## 2025-04-29 ASSESSMENT — LID POSITION - DERMATOCHALASIS
OS_DERMATOCHALASIS: 1+
OD_DERMATOCHALASIS: 1+

## 2025-05-06 ENCOUNTER — DOCTOR'S OFFICE (OUTPATIENT)
Dept: URBAN - NONMETROPOLITAN AREA CLINIC 1 | Facility: CLINIC | Age: 60
Setting detail: OPHTHALMOLOGY
End: 2025-05-06
Payer: COMMERCIAL

## 2025-05-06 DIAGNOSIS — Z96.1: ICD-10-CM

## 2025-05-06 PROCEDURE — 99024 POSTOP FOLLOW-UP VISIT: CPT | Performed by: OPHTHALMOLOGY

## 2025-05-06 ASSESSMENT — KERATOMETRY
OS_K1POWER_DIOPTERS: 46.25
OS_K2POWER_DIOPTERS: 47.25
OS_AXISANGLE_DEGREES: 077
OD_K2POWER_DIOPTERS: 46.25
OD_AXISANGLE_DEGREES: 134
OD_K1POWER_DIOPTERS: 45.75

## 2025-05-06 ASSESSMENT — VISUAL ACUITY
OD_BCVA: 20/25-2
OS_BCVA: 20/25-2

## 2025-05-06 ASSESSMENT — LID POSITION - PTOSIS
OS_PTOSIS: ABSENT
OD_PTOSIS: ABSENT

## 2025-05-06 ASSESSMENT — REFRACTION_AUTOREFRACTION
OS_CYLINDER: -1.50
OS_AXIS: 001
OD_SPHERE: +1.50
OD_AXIS: 076
OD_CYLINDER: -1.00
OS_SPHERE: +0.75

## 2025-05-06 ASSESSMENT — CONFRONTATIONAL VISUAL FIELD TEST (CVF)
OS_FINDINGS: FULL
OD_FINDINGS: FULL

## 2025-05-06 ASSESSMENT — LID POSITION - DERMATOCHALASIS
OD_DERMATOCHALASIS: 1+
OS_DERMATOCHALASIS: 1+